# Patient Record
Sex: MALE | Race: WHITE
[De-identification: names, ages, dates, MRNs, and addresses within clinical notes are randomized per-mention and may not be internally consistent; named-entity substitution may affect disease eponyms.]

---

## 2021-08-27 ENCOUNTER — HOSPITAL ENCOUNTER (OUTPATIENT)
Dept: HOSPITAL 47 - RADCTMAIN | Age: 86
Discharge: HOME | End: 2021-08-27
Attending: INTERNAL MEDICINE
Payer: MEDICARE

## 2021-08-27 DIAGNOSIS — G31.89: Primary | ICD-10-CM

## 2021-08-27 DIAGNOSIS — R55: ICD-10-CM

## 2021-08-27 PROCEDURE — 70450 CT HEAD/BRAIN W/O DYE: CPT

## 2021-08-27 NOTE — CT
EXAMINATION TYPE: CT brain wo con

 

DATE OF EXAM: 8/27/2021

 

COMPARISON: 8/12/2013

 

HISTORY: Syncope

 

CT DLP: 995.50 mGycm

Automated exposure control for dose reduction was used.

 

FINDINGS: 

Moderate generalized degenerative change. Nonspecific low attenuation in the white matter bilaterally
.

No midline shift or mass effect. No acute hemorrhage

 

Calvarium intact. Orbits are symmetric. Nasal septal deviation noted. Sella turcica has a normal appe
arance. Craniocervical junction maintained.

 

IMPRESSION: 

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

## 2021-09-09 ENCOUNTER — HOSPITAL ENCOUNTER (OUTPATIENT)
Dept: HOSPITAL 47 - NEUROMAIN | Age: 86
Discharge: HOME | End: 2021-09-09
Attending: INTERNAL MEDICINE
Payer: MEDICARE

## 2021-09-09 DIAGNOSIS — R94.31: ICD-10-CM

## 2021-09-09 DIAGNOSIS — R55: Primary | ICD-10-CM

## 2021-09-09 PROCEDURE — 95816 EEG AWAKE AND DROWSY: CPT
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
## 2021-10-20 ENCOUNTER — HOSPITAL ENCOUNTER (OUTPATIENT)
Dept: HOSPITAL 47 - RADMRIMAIN | Age: 86
Discharge: HOME | End: 2021-10-20
Attending: INTERNAL MEDICINE
Payer: MEDICARE

## 2021-10-20 DIAGNOSIS — G31.9: Primary | ICD-10-CM

## 2021-10-20 DIAGNOSIS — R94.01: ICD-10-CM

## 2021-10-20 PROCEDURE — 70553 MRI BRAIN STEM W/O & W/DYE: CPT

## 2021-10-20 NOTE — MR
EXAMINATION TYPE: MR brain wo/w con

 

DATE OF EXAM: 10/20/2021

 

COMPARISON: CT brain 8/27/2021

 

HISTORY: Abnormal EEG

 

TECHNIQUE: 

Multiplanar, multisequence images of the brain and brainstem is performed without and with IV contras
t, utilizing 7.5 mL intravenous Gadavist .

 

FINDINGS: Diffusion weighted images demonstrate no evidence of a recent infarct or other diffusion ab
normality.  There is no extra-axial fluid collection. Extensive confluent and scattered hyperintensit
ies are present on inversion recovery and T2-weighted sequences within the pericallosal, periventricu
lar, subcortical white matter. Some focal encephalomalacia present along the anterior limb of the int
ernal capsule on the left, right cerebellar hemisphere as noted on CT. Some encephalomalacia is also 
noted within the left occipital lobe with some adjacent gliosis. The ventricular system and cisternal
 spaces are normal in size and appearance.  The brain volume is age appropriate, cortical atrophy is 
again noted.

 

Midline structures demonstrate normal morphology.  The craniocervical junction appears within normal 
limits.  Post contrast images demonstrate no abnormal enhancement. The dural venous sinuses appear pa
tent. The visualized sinuses are clear and the globes are intact.

 

IMPRESSION: Age-related atrophy, chronic small vessel ischemic changes

## 2023-01-02 ENCOUNTER — HOSPITAL ENCOUNTER (OUTPATIENT)
Dept: HOSPITAL 47 - RADMRIMAIN | Age: 88
Discharge: HOME | End: 2023-01-02
Payer: MEDICARE

## 2023-01-02 DIAGNOSIS — M47.816: Primary | ICD-10-CM

## 2023-01-02 DIAGNOSIS — M43.16: ICD-10-CM

## 2023-01-02 DIAGNOSIS — M41.86: ICD-10-CM

## 2023-01-02 DIAGNOSIS — M48.061: ICD-10-CM

## 2023-01-02 PROCEDURE — 72148 MRI LUMBAR SPINE W/O DYE: CPT

## 2023-01-02 NOTE — MR
EXAMINATION TYPE: MR lumbar spine wo con

 

DATE OF EXAM: 1/2/2023

 

COMPARISON: Outside lumbar spine x-ray December 29, 2022

 

HISTORY: Low back pain for 2 years and spinal stenosis per order

 

TECHNIQUE: Multiplanar, multisequence imaging of the lumbar spine is performed without IV contrast.

 

FINDINGS: There is levoconvex scoliosis centered at L2-L3 level. There is grade 1 anterolisthesis L3 
on L4. Sagittal images of the lumbar spine show vertebral body heights to appear satisfactory. Multil
evel disc desiccation is seen. There is moderate disc space narrowing at L1-L2 and L2-L3 levels. Ther
e is mild to moderate disc space narrowing at the right L3-L4 level and the right L4-L5 levels. Moder
ate disc space narrowing L5-S1 level with heterogeneous motor type II endplate changes and moderate a
nterior spurring. Additional moderate anterior spurring L4-L5 level. Modic type II endplate changes p
osterior L2-L3 level also noted. The conus medullaris is normal in position and signal ending superio
r L1 level.

 

Axial images at T12-L1 level appear within normal limits.

 

Axial images at L1-L2 level show mild broad-based posterior disc protrusion minimally effacing the an
terior thecal sac. There is mild to moderate right greater than left facet arthropathy effacing right
 posterior lateral thecal sac. Bilateral neural foramina are patent.

 

Axial images at L2-L3 level show posterior spur disc complex effacing the anterior thecal sac along w
ith moderate facet arthropathy with ligamentum flavum hypertrophy producing posterior lateral thecal 
sac. There is mild bilateral anterior inferior neural foraminal narrowing noted.

 

Axial images at L3-L4 level show moderate broad-based posterior disc protrusion effacing the anterior
 thecal sac. There is moderate facet arthropathy and ligamentum flavum hypertrophy effacing the poste
rolateral thecal sac bilaterally. There is mild-to-moderate bilateral neural foraminal narrowing seen
.

 

Axial images at L4-L5 level show mild facet arthropathy bilaterally. There is mild-to-moderate broad 
disc bulge with left lateral disc protrusion component. There is minimal effacement of the anterior t
hecal sac. There is mild bilateral neural foraminal narrowing.

 

Axial images at L5-S1 level moderate facet arthropathy bilaterally. There is posterior spur disc comp
moses. Spinal canal is grossly preserved. There is mild right and more moderate to severe left-sided ne
ural foraminal narrowing encroaching on the left L5 nerve sagittal image 4 and axial image 4 due to l
eft foraminal lateral disc protrusion component.

 

There are thin-walled cysts from both kidneys larger on the right kidney measures 4.8 cm long axis ax
ial image 12.

 

IMPRESSION: Scoliosis with multilevel degenerative changes as detailed above. L3-L4 spondylolisthesis
 is present.

## 2023-08-18 ENCOUNTER — HOSPITAL ENCOUNTER (OUTPATIENT)
Dept: HOSPITAL 47 - ORWHC2ENDO | Age: 88
Discharge: HOME | End: 2023-08-18
Attending: INTERNAL MEDICINE
Payer: MEDICARE

## 2023-08-18 VITALS — DIASTOLIC BLOOD PRESSURE: 58 MMHG | SYSTOLIC BLOOD PRESSURE: 103 MMHG

## 2023-08-18 VITALS — HEART RATE: 67 BPM | RESPIRATION RATE: 18 BRPM

## 2023-08-18 VITALS — TEMPERATURE: 97.1 F

## 2023-08-18 VITALS — BODY MASS INDEX: 21.4 KG/M2

## 2023-08-18 DIAGNOSIS — E07.9: ICD-10-CM

## 2023-08-18 DIAGNOSIS — Z79.51: ICD-10-CM

## 2023-08-18 DIAGNOSIS — Z88.5: ICD-10-CM

## 2023-08-18 DIAGNOSIS — Z79.890: ICD-10-CM

## 2023-08-18 DIAGNOSIS — Z79.899: ICD-10-CM

## 2023-08-18 DIAGNOSIS — M19.90: ICD-10-CM

## 2023-08-18 DIAGNOSIS — J45.909: ICD-10-CM

## 2023-08-18 DIAGNOSIS — K44.9: ICD-10-CM

## 2023-08-18 DIAGNOSIS — I85.00: Primary | ICD-10-CM

## 2023-08-18 DIAGNOSIS — G40.909: ICD-10-CM

## 2023-08-18 PROCEDURE — 43249 ESOPH EGD DILATION <30 MM: CPT

## 2023-08-18 NOTE — P.PCN
Date of Procedure: 08/18/23


Procedure(s) Performed: 


BRIEF HISTORY: Patient is a 88-year-old, pleasant, white male scheduled for an 

upper endoscopy as a part of evaluation of intermittent dysphagia to solids.  He

was diagnosed with proximal; esophageal In the past for Candida with EGD with 

dilation in 2019.  Lately has been having worsening symptoms and hence scheduled

for repeat EGD with dilation. 





PROCEDURE PERFORMED: Esophagogastroduodenoscopy with dilation.





PREOPERATIVE DIAGNOSIS: Dysphagia to solids. 





IV sedation per anesthesia. 





PROCEDURE: After informed consent was obtained, the patient  was brought into 

the endoscopy unit. IV sedation was administered by Anesthesia under continuous 

monitoring. Initially the Olympus GIF-140 video endoscope was inserted into the 

mouth. Esophagus intubated without any difficulty.  In the proximal right 

esophagus there was a circumferential esophageal web identified and with gentle 

pressure I was able to advance the scope into the distal esophagus.  It was 

gradually advanced into the stomach and duodenum and carefully examined. The 

bulb and the second part of the duodenum appeared normal. The scope at this time

was withdrawn to the stomach, adequately insufflated with air, and upon careful 

examination, mucosa of the antrum, body, cardia and the fundus appeared normal. 

The scope was then withdrawn into the esophagus. The GE junction was located at 

39 cm from the incisors.  Small hiatal hernia noted.  The esophagus appeared 

normal. There were no erosions or ulcerations seen.  In the proximal cervical 

esophagus at 18 cm from the incisors there was a circumferential esophageal web 

identified and this was dilated using 10-12 mm TTS balloon in a sequential 

fashion for 30 seconds and the patient tolerated the procedure well. 





IMPRESSION: 


1.  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

12 mm TTS balloon as described above.


2.  Small hiatal hernia.





RECOMMENDATIONS: The findings of this examination were discussed with the 

patient [].